# Patient Record
Sex: FEMALE | Race: WHITE | Employment: FULL TIME | URBAN - METROPOLITAN AREA
[De-identification: names, ages, dates, MRNs, and addresses within clinical notes are randomized per-mention and may not be internally consistent; named-entity substitution may affect disease eponyms.]

---

## 2017-01-12 PROBLEM — T78.2XXA ANAPHYLAXIS: Status: ACTIVE | Noted: 2017-01-12

## 2017-05-11 ENCOUNTER — HOSPITAL ENCOUNTER (OUTPATIENT)
Dept: ULTRASOUND IMAGING | Age: 62
Discharge: HOME | End: 2017-05-11
Attending: RADIOLOGY
Payer: COMMERCIAL

## 2017-05-11 ENCOUNTER — HOSPITAL ENCOUNTER (OUTPATIENT)
Dept: MAMMOGRAPHY | Age: 62
Discharge: HOME | End: 2017-05-11
Attending: RADIOLOGY
Payer: COMMERCIAL

## 2017-05-11 DIAGNOSIS — N63.0 BREAST MASS: ICD-10-CM

## 2017-05-11 DIAGNOSIS — Z98.890 STATUS POST RIGHT BREAST BIOPSY: ICD-10-CM

## 2017-05-11 PROCEDURE — 88305 TISSUE EXAM BY PATHOLOGIST: CPT | Performed by: RADIOLOGY

## 2017-05-11 PROCEDURE — 19083 BX BREAST 1ST LESION US IMAG: CPT

## 2017-05-11 PROCEDURE — 74011000250 HC RX REV CODE- 250: Performed by: RADIOLOGY

## 2017-05-11 RX ORDER — LIDOCAINE HYDROCHLORIDE AND EPINEPHRINE 10; 10 MG/ML; UG/ML
10 INJECTION, SOLUTION INFILTRATION; PERINEURAL
Status: COMPLETED | OUTPATIENT
Start: 2017-05-11 | End: 2017-05-11

## 2017-05-11 RX ORDER — LIDOCAINE HYDROCHLORIDE AND EPINEPHRINE 10; 10 MG/ML; UG/ML
INJECTION, SOLUTION INFILTRATION; PERINEURAL
Status: DISPENSED
Start: 2017-05-11 | End: 2017-05-11

## 2017-05-11 RX ORDER — LIDOCAINE HYDROCHLORIDE 10 MG/ML
9 INJECTION INFILTRATION; PERINEURAL
Status: COMPLETED | OUTPATIENT
Start: 2017-05-11 | End: 2017-05-11

## 2017-05-11 RX ORDER — LIDOCAINE HYDROCHLORIDE 10 MG/ML
INJECTION INFILTRATION; PERINEURAL
Status: DISPENSED
Start: 2017-05-11 | End: 2017-05-11

## 2017-05-11 RX ADMIN — LIDOCAINE HYDROCHLORIDE,EPINEPHRINE BITARTRATE 20 ML: 10; .01 INJECTION, SOLUTION INFILTRATION; PERINEURAL at 09:15

## 2017-05-11 RX ADMIN — LIDOCAINE HYDROCHLORIDE 18 ML: 10 INJECTION, SOLUTION INFILTRATION; PERINEURAL at 09:15

## 2017-05-11 NOTE — DISCHARGE INSTRUCTIONS
POST STEREOTACTIC BIOPSY INSTRUCTIONS    1. WOUND CARE:   -Please keep an ice pack on the biopsy site for 20 minutes each hour through the day of the procedure. May     discontinue ice at bedtime. Do not apply ice directly to skin, protect by using thin cloth covering.    -The morning after biopsy, you may remove the gauze pad dressing. 2. Please wear your most supportive bra for 24 hours. Sleep in your bra the night of the biopsy. This will reduce the risk of bleeding, also help to hold ice pack in place. 3. You may shower after 24 hours, otherwise please keep site dry. Steri-strips may get wet. 4. If bleeding occurs from the site:  - Apply direct pressure to the site, holding it tightly for 5 minutes  - If bleeding continues after 5 minutes, call your physician    5. NO HEAVY LIFTING (GREATER THAN 5 lbs), vacuuming and/or sports activities for 48 hours after biopsy    6. Your pathology results are usually complete within 48 hours of the procedure time, excluding weekends. 7. You may take TYLENOL extra strength for pain. NO aspirin or aspirin containing products. 8. Call your doctor for a follow up appointment.

## 2017-06-06 ENCOUNTER — HOSPITAL ENCOUNTER (OUTPATIENT)
Dept: NUCLEAR MEDICINE | Age: 62
Discharge: HOME | End: 2017-06-06
Payer: COMMERCIAL

## 2017-06-06 ENCOUNTER — HOSPITAL ENCOUNTER (OUTPATIENT)
Dept: ULTRASOUND IMAGING | Age: 62
Discharge: HOME | End: 2017-06-06
Payer: COMMERCIAL

## 2017-06-06 ENCOUNTER — HOSPITAL ENCOUNTER (OUTPATIENT)
Dept: NON INVASIVE DIAGNOSTICS | Age: 62
Discharge: HOME | End: 2017-06-06
Payer: COMMERCIAL

## 2017-06-06 DIAGNOSIS — R06.02 SHORTNESS OF BREATH: ICD-10-CM

## 2017-06-06 DIAGNOSIS — N18.9 CKD (CHRONIC KIDNEY DISEASE): ICD-10-CM

## 2017-06-06 PROCEDURE — 78452 HT MUSCLE IMAGE SPECT MULT: CPT

## 2017-06-06 PROCEDURE — 76775 US EXAM ABDO BACK WALL LIM: CPT

## 2017-06-06 PROCEDURE — 93017 CV STRESS TEST TRACING ONLY: CPT

## 2017-06-07 LAB
ATTENDING PHYSICIAN, CST07: NORMAL
DIAGNOSIS, 93000: NORMAL
DUKE TM SCORE RESULT, CST14: NORMAL
DUKE TREADMILL SCORE, CST13: NORMAL
ECG INTERP BEFORE EX, CST11: NORMAL
ECG INTERP DURING EX, CST12: NORMAL
FUNCTIONAL CAPACITY, CST17: NORMAL
KNOWN CARDIAC CONDITION, CST08: NORMAL
MAX. DIASTOLIC BP, CST04: 86 MMHG
MAX. HEART RATE, CST05: 169 BPM
MAX. SYSTOLIC BP, CST03: 168 MMHG
OVERALL BP RESPONSE TO EXERCISE, CST16: NORMAL
OVERALL HR RESPONSE TO EXERCISE, CST15: NORMAL
PEAK EX METS, CST10: 7.7 METS
PROTOCOL NAME, CST01: NORMAL
TEST INDICATION, CST09: NORMAL

## 2018-01-26 ENCOUNTER — HOSPITAL ENCOUNTER (OUTPATIENT)
Dept: MAMMOGRAPHY | Age: 63
Discharge: HOME | End: 2018-01-26
Payer: OTHER GOVERNMENT

## 2018-01-26 DIAGNOSIS — N64.9 DISORDER OF BREAST: ICD-10-CM

## 2018-01-26 PROCEDURE — 77066 DX MAMMO INCL CAD BI: CPT

## 2018-08-27 ENCOUNTER — HOSPITAL ENCOUNTER (OUTPATIENT)
Dept: PHYSICAL THERAPY | Age: 63
End: 2018-08-27

## 2018-08-29 ENCOUNTER — HOSPITAL ENCOUNTER (OUTPATIENT)
Dept: PHYSICAL THERAPY | Age: 63
Discharge: HOME | End: 2018-08-29
Payer: OTHER GOVERNMENT

## 2018-08-29 PROCEDURE — 97112 NEUROMUSCULAR REEDUCATION: CPT | Performed by: PHYSICAL THERAPIST

## 2018-08-29 PROCEDURE — 97162 PT EVAL MOD COMPLEX 30 MIN: CPT | Performed by: PHYSICAL THERAPIST

## 2018-08-29 NOTE — PROGRESS NOTES
PT VESTIBULAR AND TREATMENT    Patient Name: New Blount  Date:2018  : 1955  [x]  Patient  Verified  Payor: Halima Cruz / Plan: Angelina  / Product Type: Federal Funded Programs /    In [employer] time:1217  Total Treatment Time (min): 35  Visit #: 1     Treatment Area: Vertigo [R42]    SUBJECTIVE  Pain Level (0-10 scale): 0/10  Any medication changes, allergies to medications, diagnosis change, or new procedure performed: see summary sheet for update  Subjective functional status/changes:  Pt is a 58year old female with subjective complaints of vertigo that has been going on for about a year. She state that her symptoms increase/room spinning when she looks down, tilts her head to the L, turns on the L, and rolls to the L side of the bed. She states that when she gets the episodes of vertigo last for ~40 sec. She reports she gets about a 15 episodes a day pending on what she is doing. On  she notes that when the pressure increases she was so dizzy she could not walk. She has not had imaging of the head/neck. She states that her symptoms are eased with head shakes and that she can look at a certain direction the dizziness will stop. Negative for red flags.  FOTO: 52    OBJECTIVE    Eval: 10 min    Other tests/comments: See hard chart for vestibular objective info     Patient Education/ Neuromuscular Re-education : [x] Discussed POT including PT expectation, established HEP with pictures and instruction, post Epley maneuver   (minutes) : 25    Pain Level (0-10 scale) post treatment: 0/10    ASSESSMENT  [x]  See Plan of Care    PLAN  [x]  Upgrade activities as tolerated     [x]  Continue plan of care  []  Discharge due to:_  [x] Other:_ POC  1-2x/week for 4 weeks     Justification for Eval Code Complexity:  Patient History : chronicity  Examination see exam   Clinical Presentation: evolving   Clinical Decision Making : FOTO : [phone]      Delmi Jack, DPT 8/29/2018

## 2018-08-29 NOTE — PROGRESS NOTES
[address] [facility] AT [facility]  [address] [address],[address], Clarissa cotter, Alex Black  Phone [phone]  Fax [phone] / [address] [address] PHYSICAL THERAPY SERVICES  Patient Name: Kris Sanchez : 1955   Medical   Diagnosis: Vertigo [R42] Treatment Diagnosis: Vertigo   Onset Date: 1 year ago     Referral Source: Chelita Vizcarra DO Start of Care [facility], [place]): 2018   Prior Hospitalization: See medical history Provider #: [id number]   Prior Level of Function: IND   Comorbidities: Thyroid problems   Medications: Verified on Patient Summary List   The Plan of Care and following information is based on the information from the initial evaluation.   =================================================================================  Assessment / ledesma information:  Kris Sanchez is a 58 y.o. female who presents to PT with chief complaint of vertigo that has been going on for about a year. She state that her symptoms increase/room spinning when she looks down, tilts her head to the L, turns on the L, and rolls to the L side of the bed. She states that when she gets the episodes of vertigo last for ~40 sec. She reports she gets about a 15 episodes a day pending on what she is doing. On  she notes that when the pressure increases she was so dizzy she could not walk. She has not had imaging of the head/neck. She states that her symptoms are eased with head shakes and that she can look at a certain direction the dizziness will stop. Negative for red flags. FOTO: 52/100. Objectively, the patient demonstrates + [employer] to the R indicating + BPPV on the R. Suspected symptoms on the L however, not tested due to R correction. Will assess at later visit. Pt will benefit from PT/Vestibular rehab to address these deficits, improve functional independence and reduce dizziness and imbalance with normal daily activities.  Thank you for this referral. =================================================================================  Eval Complexity: History: MEDIUM  Complexity : 1-2 comorbidities / personal factors will impact the outcome/ POC Exam:HIGH Complexity : 4+ Standardized tests and measures addressing body structure, function, activity limitation and / or participation in recreation  Presentation: MEDIUM Complexity : Evolving with changing characteristics  Clinical Decision Making:MEDIUM Complexity : FOTO score of 26-74Overall Complexity:MEDIUM  Problem List: impaired gait/ balance   Treatment Plan may include any combination of the following: Therapeutic exercise, Therapeutic activities, Neuromuscular re-education, Physical agent/modality, Gait/balance training, Manual therapy and Patient education  Patient / Family readiness to learn indicated by: asking questions and trying to perform skills  Persons(s) to be included in education: patient (P)  Barriers to Learning/Limitations: None  Measures taken:    Patient Goal (s): Get rid of vertigo   Patient self reported health status: excellent  Rehabilitation Potential: good   Short Term Goals: To be accomplished in  4  weeks:  1. Pt will be IND and compliant with HEP for self-management of symptoms. 2. Pt will demo - [employer] B as an indicator of improved BPPV symptoms.  Long Term Goals: To be accomplished in  8  weeks:  1. Pt will report 50% improvement of symptoms as an indicator of improved mobility. 2. Pt will improve FOTO to at least 63/100 as a functional indicator of improved mobility. 3. Pt will be able to roll over in bed without restrictions/vertigo to improved sleeping stability.     Frequency / Duration:   Patient to be seen  1-2  times per week for 4  weeks:  Patient / Caregiver education and instruction: self care and activity modification  G-Codes (GP): JANNA  Therapist Signature: Camie Jeans, DPT Date: 0/25/0784   Certification Period: JANNA Time: 12:45 PM ===========================================================================================  I certify that the above Physical Therapy Services are being furnished while the patient is under my care. I agree with the treatment plan and certify that this therapy is necessary. Physician Signature:        Date:       Time:     Please sign and return to [facility] [place] or you may fax the signed copy to [phone]. Thank you.

## 2018-08-31 ENCOUNTER — HOSPITAL ENCOUNTER (OUTPATIENT)
Dept: PHYSICAL THERAPY | Age: 63
Discharge: HOME | End: 2018-08-31
Payer: OTHER GOVERNMENT

## 2018-08-31 PROCEDURE — 97112 NEUROMUSCULAR REEDUCATION: CPT | Performed by: PHYSICAL THERAPIST

## 2018-08-31 NOTE — PROGRESS NOTES
PT DAILY TREATMENT NOTE  Patient Name: Moises Olmedo Date:2018 : 1955 [x]  Patient  Verified Payor: Fede Mejia / Plan: Greg Araujo / Product Type: Federal Funded Programs / In time:1134  Out time:1202 Total Treatment Time (min): 28 Visit #: 2 of  Treatment Area: Vertigo [R42] SUBJECTIVE Pain Level (0-10 scale): 0 Any medication changes, allergies to medications, adverse drug reactions, diagnosis change, or new procedure performed?: [x] No    [] Yes (see summary sheet for update) Subjective functional status/changes:   [] No changes reported Pt reports she feels good with decreased dizziness with head turns to the L only to the R now. OBJECTIVE 28 min Manual Therapy: Technique:     
Epley x2 to the L Rationale:      decrease pain, increase ROM, increase tissue extensibility and decrease trigger points to improve patient's ability to improve head turns with reduced dizziness With 
 [] TE 
 [] TA 
 [] neuro 
 [] other: Patient Education: [x] Review HEP [] Progressed/Changed HEP based on:  
[] positioning   [] body mechanics   [] transfers   [] heat/ice application [] Graston Education: Explained the effects and benefits of Graston Technique therapy including potential for post treatment soreness and bruising. [] Other:   
 
Other Objective/Functional Measures:  
+ samra garcia pike to the L Pain Level (0-10 scale) post treatment: 0 
 
ASSESSMENT/Changes in Function: Pt presents with B + BPPV which responded + to epley maneuver. Assess effects next session Patient will continue to benefit from skilled PT services to address imbalance/dizziness to attain remaining goals. Progress towards goals / Updated goals: 
1st FU visit, initiated PT POC PLAN [x]  Upgrade activities as tolerated     [x]  Continue plan of care 
[]  Update interventions per flow sheet      
[]  Discharge due to:_ 
 [x]  Other: check goals, assess jose nettles to the L next visit Tomer Bishop DPT 8/31/2018  3:25 PM 
 
Future Appointments Date Time Provider Tony Child 9/5/2018 11:00 AM Tomer Bishop DPT [facility]  
9/7/2018 11:30 AM Tomer Bishop DPT [facility]  
9/11/2018 11:00 AM Tomer Bishop DPT [facility]  
9/14/2018 1:30 PM Tomer Bishop DPT [facility]  
9/18/2018 11:00 AM Tomer Bishop DPT [facility]  
9/21/2018 1:30 PM Tomer Bishop DPT [facility]  
9/25/2018 11:00 AM Tomer Bishop DPT [facility]  
9/28/2018 1:30 PM Tomer Bishop DPT [facility]

## 2018-09-05 ENCOUNTER — HOSPITAL ENCOUNTER (OUTPATIENT)
Dept: PHYSICAL THERAPY | Age: 63
Discharge: HOME | End: 2018-09-05
Payer: OTHER GOVERNMENT

## 2018-09-05 PROCEDURE — 97140 MANUAL THERAPY 1/> REGIONS: CPT | Performed by: PHYSICAL THERAPIST

## 2018-09-05 NOTE — PROGRESS NOTES
PT DAILY TREATMENT NOTE     Patient Name: Kris Sanchez  Date:2018  : 1955  [x]  Patient  Verified  Payor: Tomer Sahni / Plan: Jah Liu / Product Type: Federal Funded Programs /    In [employer] time:1130  Total Treatment Time (min): 28  Visit #: 3 of   Treatment Area: Vertigo [R42]    SUBJECTIVE  Pain Level (0-10 scale): 0  Any medication changes, allergies to medications, adverse drug reactions, diagnosis change, or new procedure performed?: [x] No    [] Yes (see summary sheet for update)  Subjective functional status/changes:   [] No changes reported  I only get dizzy with up and down head movements    OBJECTIVE    28 min Manual Therapy: Technique:      Epley x2 to the L   Rationale:      decrease pain, increase ROM, increase tissue extensibility and decrease trigger points to improve patient's ability to improve head turns with reduced dizziness        With   [] TE   [] TA   [] neuro   [] other: Patient Education: [x] Review HEP    [] Progressed/Changed HEP based on:   [] positioning   [] body mechanics   [] transfers   [] heat/ice application    [] Graston Education: Explained the effects and benefits of Graston Technique therapy including potential for post treatment soreness and bruising. [] Other:      Other Objective/Functional Measures:   + samra garcia pike to the R with delayed response and head turns to the L     Pain Level (0-10 scale) post treatment: 0    ASSESSMENT/Changes in Function: Pt presents with B + BPPV which responded + to epley maneuver. Assess effects next session    Patient will continue to benefit from skilled PT services to address imbalance/dizziness to attain remaining goals. Progress towards goals / Updated goals: · Short Term Goals: To be accomplished in  4  weeks:  1. Pt will be IND and compliant with HEP for self-management of symptoms. 2. Pt will demo - [employer] B as an indicator of improved BPPV symptoms.  Progressing, 9/5/18  · Long Term Goals: To be accomplished in  8  weeks:  1. Pt will report 50% improvement of symptoms as an indicator of improved mobility. 2. Pt will improve FOTO to at least 63/100 as a functional indicator of improved mobility. 3. Pt will be able to roll over in bed without restrictions/vertigo to improved sleeping stability.     PLAN  [x]  Upgrade activities as tolerated     [x]  Continue plan of care  []  Update interventions per flow sheet       []  Discharge due to:_  [x]  Other: check goals, assess jose nettles to the L next visit      Efren Park DPT 9/5/2018  3:25 PM    Future Appointments  Date Time Provider Tony Child   9/7/2018 11:30 AM Efren Park, [facility]   9/11/2018 11:00 AM Efren Park, [facility]   9/14/2018 1:30 PM Efren Park, [facility]   9/18/2018 11:00 AM Efren Park, [facility]   9/21/2018 1:30 PM Efren Park, [facility]   9/25/2018 11:00 AM Efren Park, [facility]   9/28/2018 1:30 PM Efren Park, [facility]

## 2018-09-07 ENCOUNTER — HOSPITAL ENCOUNTER (OUTPATIENT)
Dept: PHYSICAL THERAPY | Age: 63
Discharge: HOME | End: 2018-09-07
Payer: OTHER GOVERNMENT

## 2018-09-07 PROCEDURE — 97140 MANUAL THERAPY 1/> REGIONS: CPT | Performed by: PHYSICAL THERAPIST

## 2018-09-07 NOTE — PROGRESS NOTES
PT DAILY TREATMENT NOTE     Patient Name: Leonardo Martino  Date:2018  : 1955  [x]  Patient  Verified  Payor: Lashae Elena / Plan: Jasmine Brittle / Product Type: Federal Funded Programs /    In [employer]  Out time:1200  Total Treatment Time (min): 27  Visit #: 4 of   Treatment Area: Vertigo [R42]    SUBJECTIVE  Pain Level (0-10 scale): 0  Any medication changes, allergies to medications, adverse drug reactions, diagnosis change, or new procedure performed?: [x] No    [] Yes (see summary sheet for update)  Subjective functional status/changes:   [] No changes reported  Pt reports continued dizziness with up and down head movements. OBJECTIVE    27 min Manual Therapy: Technique:      Epley x2 to the L   Rationale:      decrease pain, increase ROM, increase tissue extensibility and decrease trigger points to improve patient's ability to improve head turns with reduced dizziness        With   [] TE   [] TA   [] neuro   [] other: Patient Education: [x] Review HEP    [] Progressed/Changed HEP based on:   [] positioning   [] body mechanics   [] transfers   [] heat/ice application    [] Graston Education: Explained the effects and benefits of Graston Technique therapy including potential for post treatment soreness and bruising. [] Other:      Other Objective/Functional Measures:   + samra garcia pike to the R with delayed response and head turns to the L     Pain Level (0-10 scale) post treatment: 0    ASSESSMENT/Changes in Function: Pt presents with B + BPPV which responded + to epley maneuver. Assess effects next session. Patient will continue to benefit from skilled PT services to address imbalance/dizziness to attain remaining goals. Progress towards goals / Updated goals: · Short Term Goals: To be accomplished in  4  weeks:  1. Pt will be IND and compliant with HEP for self-management of symptoms. On going, compliant with post epley instructions 18  2.  Pt will demo - [employer] B as an indicator of improved BPPV symptoms. Progressing, 9/5/18  · Long Term Goals: To be accomplished in  8  weeks:  1. Pt will report 50% improvement of symptoms as an indicator of improved mobility. 2. Pt will improve FOTO to at least 63/100 as a functional indicator of improved mobility. 3. Pt will be able to roll over in bed without restrictions/vertigo to improved sleeping stability.     PLAN  [x]  Upgrade activities as tolerated     [x]  Continue plan of care  []  Update interventions per flow sheet       []  Discharge due to:_  [x]  Other: check goals, assess garcia yoon to the L next visit      Evi Hobbs DPT 9/7/2018  3:25 PM    Future Appointments  Date Time Provider Tony Child   9/11/2018 11:00 AM Evi Hobbs DPT [facility]   9/14/2018 1:30 PM Evi Hobbs DPT [facility]   9/18/2018 11:00 AM Evi Hobbs DPT [facility]   9/21/2018 1:30 PM Evi Hobbs DPT [facility]   9/25/2018 11:00 AM Evi Hobbs DPT [facility]   9/28/2018 1:30 PM Evi Hobbs DPT [facility]

## 2018-09-11 ENCOUNTER — APPOINTMENT (OUTPATIENT)
Dept: PHYSICAL THERAPY | Age: 63
End: 2018-09-11
Payer: OTHER GOVERNMENT

## 2018-09-14 ENCOUNTER — APPOINTMENT (OUTPATIENT)
Dept: PHYSICAL THERAPY | Age: 63
End: 2018-09-14
Payer: OTHER GOVERNMENT

## 2018-09-18 ENCOUNTER — APPOINTMENT (OUTPATIENT)
Dept: PHYSICAL THERAPY | Age: 63
End: 2018-09-18
Payer: OTHER GOVERNMENT

## 2018-09-21 ENCOUNTER — HOSPITAL ENCOUNTER (OUTPATIENT)
Dept: PHYSICAL THERAPY | Age: 63
Discharge: HOME | End: 2018-09-21
Payer: OTHER GOVERNMENT

## 2018-09-21 PROCEDURE — 97140 MANUAL THERAPY 1/> REGIONS: CPT | Performed by: PHYSICAL THERAPIST

## 2018-09-21 NOTE — PROGRESS NOTES
PT DAILY TREATMENT NOTE     Patient Name: Gary Valdez  Date:2018  : 1955  [x]  Patient  Verified  Payor: Marcial Marie / Plan: Eugenia Trejo / Product Type: Federal Funded Programs /    In time:130  Out time:155  Total Treatment Time (min): 25  Visit #: 5 of   Treatment Area: Vertigo [R42]    SUBJECTIVE  Pain Level (0-10 scale): 0  Any medication changes, allergies to medications, adverse drug reactions, diagnosis change, or new procedure performed?: [x] No    [] Yes (see summary sheet for update)  Subjective functional status/changes:   [] No changes reported  I have only had it a few times when I look down to the R and roll over in bed to the R. Pt reports 40% improvement since [facility]. She reports decreased frequency of vertigo. OBJECTIVE    25 min Manual Therapy: Technique:      Epley x1 to the R   Rationale:      decrease pain, increase ROM, increase tissue extensibility and decrease trigger points to improve patient's ability to improve head turns with reduced dizziness        With   [] TE   [] TA   [] neuro   [] other: Patient Education: [x] Review HEP    [] Progressed/Changed HEP based on:   [] positioning   [] body mechanics   [] transfers   [] heat/ice application    [] Graston Education: Explained the effects and benefits of Graston Technique therapy including potential for post treatment soreness and bruising. [] Other:      Other Objective/Functional Measures:   + samra garcia pike to the R      Pain Level (0-10 scale) post treatment: 0    ASSESSMENT/Changes in Function: Pt presents with R + BPPV which responded + to epley maneuver. Assess effects next session. Patient will continue to benefit from skilled PT services to address imbalance/dizziness to attain remaining goals. Progress towards goals / Updated goals: · Short Term Goals: To be accomplished in  4  weeks:  1. Pt will be IND and compliant with HEP for self-management of symptoms.  On going, compliant with post epley instructions 9/7/18  2. Pt will demo - [employer] B as an indicator of improved BPPV symptoms. Progressing, 9/5/18  · Long Term Goals: To be accomplished in  8  weeks:  1. Pt will report 50% improvement of symptoms as an indicator of improved mobility. Progressing, 40% improvement 9/21/18  2. Pt will improve FOTO to at least 63/100 as a functional indicator of improved mobility. 3. Pt will be able to roll over in bed without restrictions/vertigo to improved sleeping stability.  Progressing 9/21/18    PLAN  [x]  Upgrade activities as tolerated     [x]  Continue plan of care  []  Update interventions per flow sheet       []  Discharge due to:_  [x]  Other: check goals, assess jose nettles to the L next visit      Stevan Marx DPT 9/21/2018  155 PM    Future Appointments  Date Time Provider Tony Child   9/25/2018 11:00 AM Stevan Marx DPT [facility]   9/28/2018 1:30 PM Stevan Marx DPT [facility]

## 2018-09-25 ENCOUNTER — APPOINTMENT (OUTPATIENT)
Dept: PHYSICAL THERAPY | Age: 63
End: 2018-09-25
Payer: OTHER GOVERNMENT

## 2018-10-02 ENCOUNTER — HOSPITAL ENCOUNTER (OUTPATIENT)
Dept: PHYSICAL THERAPY | Age: 63
Discharge: HOME | End: 2018-10-02
Payer: OTHER GOVERNMENT

## 2018-10-02 PROCEDURE — 97140 MANUAL THERAPY 1/> REGIONS: CPT | Performed by: PHYSICAL THERAPIST

## 2018-10-02 NOTE — PROGRESS NOTES
PT DAILY TREATMENT NOTE     Patient Name: Gaurang Salcedo  Date:10/2/2018  : 1955  [x]  Patient  Verified  Payor: Irving Kaur / Plan: Kevin Gene / Product Type: Federal Funded Programs /    In [employer] time:820  Total Treatment Time (min): 32  Visit #: 5 of   Treatment Area: Vertigo [R42]    SUBJECTIVE  Pain Level (0-10 scale): 0  Any medication changes, allergies to medications, adverse drug reactions, diagnosis change, or new procedure performed?: [x] No    [] Yes (see summary sheet for update)  Subjective functional status/changes:   [] No changes reported  Pt reports 50% improvement since SOC. She notes improvements in overall frequency of symptoms and decreased symptoms. She no longer has symptoms when she looks down while walking. She reports she continues to have vertigo when she turns over to each side at night. OBJECTIVE    32 min Manual Therapy: Technique:      Epley x2 to the L, PT reassessment   Rationale:      decrease pain, increase ROM, increase tissue extensibility and decrease trigger points to improve patient's ability to improve head turns with reduced dizziness        With   [] TE   [] TA   [] neuro   [] other: Patient Education: [x] Review HEP    [] Progressed/Changed HEP based on:   [] positioning   [] body mechanics   [] transfers   [] heat/ice application    [] Graston Education: Explained the effects and benefits of Graston Technique therapy including potential for post treatment soreness and bruising. [] Other:      Other Objective/Functional Measures:   + samra garcia pike to the L  FOTO improved to 75/100 from 52/100    Pain Level (0-10 scale) post treatment: 0    ASSESSMENT/Changes in Function: Upon reassessment, Pt continues to present with + Garcia pike on the L and was + on the R last session indicating BPPV B. Noted improvements in FOTO score indicate improvements and justify improvements in ADL tolerance.  PT would benefit from continued therapy to further address these deficits to return to OF. Patient will continue to benefit from skilled PT services to address imbalance/dizziness to attain remaining goals. Progress towards goals / Updated goals: · Short Term Goals: To be accomplished in  4  weeks:  1. Pt will be IND and compliant with HEP for self-management of symptoms. On going, compliant with post epley instructions 10/2/18  2. Pt will demo - [employer] B as an indicator of improved BPPV symptoms. Progressing, 10/2/18  · Long Term Goals: To be accomplished in  8  weeks:  1. Pt will report 50% improvement of symptoms as an indicator of improved mobility met 10/2/18  2. Pt will improve FOTO to at least 63/100 as a functional indicator of improved mobility. met 10/2/18  3. Pt will be able to roll over in bed without restrictions/vertigo to improved sleeping stability.  Progressing, 10/2/18    PLAN  [x]  Upgrade activities as tolerated     [x]  Continue plan of care  []  Update interventions per flow sheet       []  Discharge due to:_  [x]  Other: Continue PT 2x/week for 4 weeks to progress towards long-term goals    Ene Bradford DPT 10/2/2018  838 AM    Future Appointments  Date Time Provider Tony Child   10/5/2018 11:00 AM Ene Bradford DPT [facility]   10/8/2018 1:00 PM Ene Bradford DPT [facility]   10/12/2018 11:00 AM Ene Bradford DPT [facility]   10/16/2018 12:30 PM Ene Bradford DPT [facility]   10/19/2018 11:00 AM Ene Bradford DPT [facility]   10/23/2018 11:00 AM Ene Bradford DPT [facility]   10/26/2018 11:00 AM Ene Bradford DPT [facility]   10/30/2018 11:00 AM Ene Bradford DPT [facility]

## 2018-10-02 NOTE — PROGRESS NOTES
[address] [facility]AP[facility] AT [facility]  [address] [facility] [address] [address],[address], [place], [place]  Phone [phone]  Fax [phone]  PROGRESS NOTE  Patient Name: Sesar Burk : 1955   Treatment/Medical Diagnosis: Vertigo [R42]   Referral Source: Almita Baca DO     Date of Initial Visit: 18 Attended Visits: 5 Missed Visits: 1     SUMMARY OF TREATMENT  Pt seen for vestibular rehab for 5 sessions for B BPPV. Therapy has included the Epley maneuver to reduce symptoms. CURRENT STATUS  Pt reports 50% improvement since [facility]. She notes improvements in overall frequency of symptoms and decreased symptoms. She no longer has symptoms when she looks down while walking. She reports she continues to have vertigo when she turns over to each side at night. Upon reassessment, Pt continues to present with + Riley pike on the L and was + on the R last session indicating BPPV B. Noted improvements in FOTO score indicate improvements and justify improvements in ADL tolerance. PT would benefit from continued therapy to further address these deficits to return to PLOF.        Goal/Measure of Progress Goal Met? · Short Term Goals: To be accomplished in  4  weeks:  1. Pt will be IND and compliant with HEP for self-management of symptoms. On going, compliant with post epley instructions 10/2/18  2. Pt will demo - [employer] B as an indicator of improved BPPV symptoms. Progressing, 10/2/18  · Long Term Goals: To be accomplished in  8  weeks:  1. Pt will report 50% improvement of symptoms as an indicator of improved mobility met 10/2/18  2. Pt will improve FOTO to at least 63/100 as a functional indicator of improved mobility. met 10/2/18  3. Pt will be able to roll over in bed without restrictions/vertigo to improved sleeping stability. Progressing, 10/2/18    New Goals to be achieved in __4__  weeks:  1. Pt will demo - [employer] B as an indicator of improved BPPV symptoms.   2. Pt will be able to roll over in bed without restrictions/vertigo to improved sleeping stability. G-Codes: NA  RECOMMENDATIONS  Continue PT 2x/week for 4 weeks to progress towards long-term goals  If you have any questions/comments please contact us directly at [phone]. Thank you for allowing us to assist in the care of your patient. Therapist Signature: Yogi Leon DPT Date: 10/2/2018     Time: 8:41 AM   NOTE TO PHYSICIAN:  PLEASE COMPLETE THE ORDERS BELOW AND FAX TO   [facility] at [facility] [place]: [phone]. If you are unable to process this request in 24 hours please contact our office: [phone].    ___ I have read the above report and request that my patient continue as recommended.   ___ I have read the above report and request that my patient continue therapy with the following changes/special instructions:_________________________________________________________   ___ I have read the above report and request that my patient be discharged from therapy.      Physician Signature:        Date:       Time:

## 2018-10-05 ENCOUNTER — HOSPITAL ENCOUNTER (OUTPATIENT)
Dept: PHYSICAL THERAPY | Age: 63
Discharge: HOME | End: 2018-10-05
Payer: OTHER GOVERNMENT

## 2018-10-05 PROCEDURE — 97140 MANUAL THERAPY 1/> REGIONS: CPT | Performed by: PHYSICAL THERAPIST

## 2018-10-05 NOTE — PROGRESS NOTES
PT DAILY TREATMENT NOTE     Patient Name: Bhavya Sarkar  Date:10/5/2018  : 1955  [x]  Patient  Verified  Payor: Laureen Pak / Plan: Fausto Marcus / Product Type: Federal Funded Programs /    In time:1100  Out time:1123  Total Treatment Time (min): 23  Visit #: 6 of -  Treatment Area: Vertigo [R42]    SUBJECTIVE  Pain Level (0-10 scale): 0  Any medication changes, allergies to medications, adverse drug reactions, diagnosis change, or new procedure performed?: [x] No    [] Yes (see summary sheet for update)  Subjective functional status/changes:   [] No changes reported  Pt reports that she still gets dizzy turning over the L in bed but it is much better. OBJECTIVE    23 min Manual Therapy: Technique:      Epley x1 to the L   Rationale:      decrease pain, increase ROM, increase tissue extensibility and decrease trigger points to improve patient's ability to improve head turns with reduced dizziness        With   [] TE   [] TA   [] neuro   [] other: Patient Education: [x] Review HEP    [] Progressed/Changed HEP based on:   [] positioning   [] body mechanics   [] transfers   [] heat/ice application    [] Graston Education: Explained the effects and benefits of Graston Technique therapy including potential for post treatment soreness and bruising. [] Other:      Other Objective/Functional Measures:   + samra garcia pike to the L      Pain Level (0-10 scale) post treatment: 0    ASSESSMENT/Changes in Function: + garcia pike to the L corrected x1 with Epley maneuver. Noted improvements in FOTO score indicate improvements and justify improvements in ADL tolerance. PT would benefit from continued therapy to further address these deficits to return to PLOF. Patient will continue to benefit from skilled PT services to address imbalance/dizziness to attain remaining goals. Progress towards goals / Updated goals:  1.  Pt will demo - [employer] B as an indicator of improved BPPV symptoms. 2. Pt will be able to roll over in bed without restrictions/vertigo to improved sleeping stability.     PLAN  [x]  Upgrade activities as tolerated     [x]  Continue plan of care  []  Update interventions per flow sheet       []  Discharge due to:_  [x]  Other: check goals    Bell Saab DPT 10/5/2018  1230 PM    Future Appointments  Date Time Provider Tony Child   10/8/2018 1:00 PM Bell Saab DPT [facility]   10/12/2018 11:00 AM Bell Saab DPT [facility]   10/16/2018 12:30 PM Bell Saab DPT [facility]   10/19/2018 11:00 AM Bell Saab DPT [facility]   10/23/2018 11:00 AM Bell Saab DPT [facility]   10/26/2018 11:00 AM Bell Saab DPT [facility]   10/30/2018 11:00 AM Bell Saab DPT [facility]

## 2018-10-08 ENCOUNTER — HOSPITAL ENCOUNTER (OUTPATIENT)
Dept: PHYSICAL THERAPY | Age: 63
Discharge: HOME | End: 2018-10-08
Payer: OTHER GOVERNMENT

## 2018-10-08 PROCEDURE — 97112 NEUROMUSCULAR REEDUCATION: CPT | Performed by: PHYSICAL THERAPIST

## 2018-10-08 NOTE — PROGRESS NOTES
PT DAILY TREATMENT NOTE     Patient Name: Julita Fournier  Date:10/8/2018  : 1955  [x]  Patient  Verified  Payor: Mike Redmond / Plan: Jensen Fu / Product Type: Federal Funded Programs /    In time:103 Out time:1125  Total Treatment Time (min): 22  Visit #: 2 of   Treatment Area: Vertigo [R42]    SUBJECTIVE  Pain Level (0-10 scale): 0  Any medication changes, allergies to medications, adverse drug reactions, diagnosis change, or new procedure performed?: [x] No    [] Yes (see summary sheet for update)  Subjective functional status/changes:   [] No changes reported  I have not been dizzy all weekend. I was able to look up at the birds while hiking and I didn't get dizzy. I was also able to lay on my side and watch TV and I didn't get dizzy. OBJECTIVE\    17 min Neuromuscular Re-education:  []  See flow sheet : balance assessment   Rationale: improve balance and increase proprioception  to improve the patients ability to improve ADL tolerance without LOB    5 min Manual Therapy: Technique:      Nicky GARCIA    Rationale:      decrease pain, increase ROM, increase tissue extensibility and decrease trigger points to improve patient's ability to improve head turns with reduced dizziness        With   [] TE   [] TA   [] neuro   [] other: Patient Education: [x] Review HEP    [] Progressed/Changed HEP based on:   [] positioning   [] body mechanics   [] transfers   [] heat/ice application    [] Graston Education: Explained the effects and benefits of Graston Technique therapy including potential for post treatment soreness and bruising. [] Other:      Other Objective/Functional Measures:   - samra jose lindseye B  Pt was able to perform static acts per flow sheet    Pain Level (0-10 scale) post treatment: 0    ASSESSMENT/Changes in Function: Negative garcia B indicating negative BPPV B.  Pt able to maintain static balance acts EO/EC without LOB on floor for 30 sec indicating maintained vestibular function. WIll assess next session to see if symptoms remained resolved and progress to HEP following pending symptoms. PT would benefit from continued therapy to further address these deficits to return to [facility]. Patient will continue to benefit from skilled PT services to address imbalance/dizziness to attain remaining goals. Progress towards goals / Updated goals:  1. Pt will demo - [employer] B as an indicator of improved BPPV symptoms. Met 10/8/18  2. Pt will be able to roll over in bed without restrictions/vertigo to improved sleeping stability.     PLAN  [x]  Upgrade activities as tolerated     [x]  Continue plan of care  []  Update interventions per flow sheet       []  Discharge due to:_  []  Other:     Karyle Lei, DPT 10/8/2018  501 PM    Future Appointments  Date Time Provider Tony Child   10/12/2018 11:00 AM Karyle Lei, DPT [facility]   10/16/2018 12:30 PM Karyle Lei, DPT [facility]   10/19/2018 11:00 AM Karyle Lei, DPT [facility]   10/23/2018 11:00 AM Karyle Lei, DPT [facility]   10/26/2018 11:00 AM Karyle Lei, DPT [facility]   10/30/2018 11:00 AM Karyle Lei, DPT [facility]

## 2018-10-12 ENCOUNTER — APPOINTMENT (OUTPATIENT)
Dept: PHYSICAL THERAPY | Age: 63
End: 2018-10-12
Payer: OTHER GOVERNMENT

## 2018-10-16 ENCOUNTER — HOSPITAL ENCOUNTER (OUTPATIENT)
Dept: PHYSICAL THERAPY | Age: 63
Discharge: HOME | End: 2018-10-16
Payer: OTHER GOVERNMENT

## 2018-10-16 PROCEDURE — 97140 MANUAL THERAPY 1/> REGIONS: CPT | Performed by: PHYSICAL THERAPIST

## 2018-10-16 NOTE — PROGRESS NOTES
PT DAILY TREATMENT NOTE     Patient Name: Alistair Gonzalez  Date:10/16/2018  : 1955  [x]  Patient  Verified  Payor: Kallie Patterson / Plan: Virgil Bethea / Product Type: Federal Funded Programs /    In [employer] time:1255  Total Treatment Time (min): 25  Visit #: 3  of 8  Treatment Area: Vertigo [R42]    SUBJECTIVE  Pain Level (0-10 scale): 0  Any medication changes, allergies to medications, adverse drug reactions, diagnosis change, or new procedure performed?: [x] No    [] Yes (see summary sheet for update)  Subjective functional status/changes:   [] No changes reported  Pt reports that she is not dizzy right now but she was dizzy to the L this past weekend. OBJECTIVE\    25 min Manual Therapy: Technique:      Pearlene Ruck pike with epley 1 x to the L   Rationale:      decrease pain, increase ROM, increase tissue extensibility and decrease trigger points to improve patient's ability to improve head turns with reduced dizziness        With   [] TE   [] TA   [] neuro   [] other: Patient Education: [x] Review HEP    [] Progressed/Changed HEP based on:   [] positioning   [] body mechanics   [] transfers   [] heat/ice application    [] Graston Education: Explained the effects and benefits of Graston Technique therapy including potential for post treatment soreness and bruising. [] Other:      Other Objective/Functional Measures:   + samra garcia pike to the L with + correction with epley    Pain Level (0-10 scale) post treatment: 0    ASSESSMENT/Changes in Function: Negative garcia B indicating negative BPPV B. Pt able to maintain static balance acts EO/EC without LOB on floor for 30 sec indicating maintained vestibular function. WIll assess next session to see if symptoms remained resolved and progress to HEP following pending symptoms. PT would benefit from continued therapy to further address these deficits to return to PLOF.      Patient will continue to benefit from skilled PT services to address imbalance/dizziness to attain remaining goals. Progress towards goals / Updated goals:  1. Pt will demo - [employer] B as an indicator of improved BPPV symptoms. Progressing, + to the L this session 10/16/18  2. Pt will be able to roll over in bed without restrictions/vertigo to improved sleeping stability.  Progressing the L 10/16/18    PLAN  [x]  Upgrade activities as tolerated     [x]  Continue plan of care  []  Update interventions per flow sheet       []  Discharge due to:_  []  Other:     Ritika Jimenez DPT 10/16/2018  1252 PM    Future Appointments  Date Time Provider Tony Child   10/19/2018 11:00 AM Ritika Jimenez DPT [facility]   10/23/2018 11:00 AM Ritika Jimenez DPT [facility]   10/26/2018 11:00 AM Ritika Jimenez DPT [facility]   10/30/2018 11:00 AM Ritika Jimenez DPT [facility]

## 2018-10-19 ENCOUNTER — APPOINTMENT (OUTPATIENT)
Dept: PHYSICAL THERAPY | Age: 63
End: 2018-10-19
Payer: OTHER GOVERNMENT

## 2018-10-26 ENCOUNTER — HOSPITAL ENCOUNTER (OUTPATIENT)
Dept: PHYSICAL THERAPY | Age: 63
Discharge: HOME | End: 2018-10-26
Payer: OTHER GOVERNMENT

## 2018-10-26 PROCEDURE — 97140 MANUAL THERAPY 1/> REGIONS: CPT | Performed by: PHYSICAL THERAPIST

## 2018-10-26 NOTE — PROGRESS NOTES
PT DAILY TREATMENT NOTE     Patient Name: Corby Rodriguez  Date:10/26/2018  : 1955  [x]  Patient  Verified  Payor: Perez Favor / Plan: Rashard Chance / Product Type: Federal Funded Programs /    In time:1100 Out time:1126  Total Treatment Time (min): 26  Visit #: 4  of 8  Treatment Area: Vertigo [R42]    SUBJECTIVE  Pain Level (0-10 scale): 0  Any medication changes, allergies to medications, adverse drug reactions, diagnosis change, or new procedure performed?: [x] No    [] Yes (see summary sheet for update)  Subjective functional status/changes:   [] No changes reported  I got dizzy this morning bending over and to the L  OBJECTIVE\    26 min Manual Therapy: Technique:      Lynda nettles with epley 1 x to the L   Rationale:      decrease pain, increase ROM, increase tissue extensibility and decrease trigger points to improve patient's ability to improve head turns with reduced dizziness        With   [] TE   [] TA   [] neuro   [] other: Patient Education: [x] Review HEP    [] Progressed/Changed HEP based on:   [] positioning   [] body mechanics   [] transfers   [] heat/ice application    [] Graston Education: Explained the effects and benefits of Graston Technique therapy including potential for post treatment soreness and bruising. [] Other:      Other Objective/Functional Measures:   + samra garcia pike to the L with + correction with epley    Pain Level (0-10 scale) post treatment: 0    ASSESSMENT/Changes in Function: Pt continues to be + to the L samra garcia pike indicating + BPPV on the L     Patient will continue to benefit from skilled PT services to address imbalance/dizziness to attain remaining goals. Progress towards goals / Updated goals:  1. Pt will demo - [employer] B as an indicator of improved BPPV symptoms. Progressing, + to the L this session 10/26/18  2. Pt will be able to roll over in bed without restrictions/vertigo to improved sleeping stability.  Progressing the L 10/16/18    PLAN  [x]  Upgrade activities as tolerated     [x]  Continue plan of care  []  Update interventions per flow sheet       []  Discharge due to:_  []  Other:     Sanford Fonseca DPT 10/26/2018  115 PM    Future Appointments   Date Time Provider Tony Child   10/30/2018 11:00 AM Ophelia Bullock DPT [facility] [address]

## 2018-11-12 ENCOUNTER — HOSPITAL ENCOUNTER (OUTPATIENT)
Dept: PHYSICAL THERAPY | Age: 63
Discharge: HOME | End: 2018-11-12
Payer: OTHER GOVERNMENT

## 2018-11-12 PROCEDURE — 97112 NEUROMUSCULAR REEDUCATION: CPT | Performed by: PHYSICAL THERAPIST

## 2018-11-12 NOTE — PROGRESS NOTES
PT DAILY TREATMENT NOTE     Patient Name: Jacobo Uriarte  Date:2018  : 1955  [x]  Patient  Verified  Payor: Naveed Gil / Plan: Leatha Ache / Product Type: Federal Funded Programs /    In time:103  Out time:120  Total Treatment Time (min): 17  Visit #: 4of 8  Treatment Area: Vertigo [R42]    SUBJECTIVE  Pain Level (0-10 scale): 0  Any medication changes, allergies to medications, adverse drug reactions, diagnosis change, or new procedure performed?: [x] No    [] Yes (see summary sheet for update)  Subjective functional status/changes:   [] No changes reported  Pt reports 90% improvement since SOC. She reports improvements in the frequency of dizziness. She is able to look up/down without restrictions. She will still get the dizziness when she rolls out of bed and getting out of bed on the L.       OBJECTIVE    17 min Manual Therapy: Technique:      Reassessment   Rationale:      decrease pain, increase ROM, increase tissue extensibility and decrease trigger points to improve patient's ability to improve head turns with reduced dizziness        With   [] TE   [] TA   [] neuro   [] other: Patient Education: [x] Review HEP    [] Progressed/Changed HEP based on:   [] positioning   [] body mechanics   [] transfers   [] heat/ice application    [] Graston Education: Explained the effects and benefits of Graston Technique therapy including potential for post treatment soreness and bruising. [] Other:      Other Objective/Functional Measures:   Negative jose BORDENTO improved to 97/100 from 75/100 at last assessment    Pain Level (0-10 scale) post treatment: 0/10    ASSESSMENT/Changes in Function: Upon reassessment, pt was negative for [employer] indicating negative for BPPV. At this time pt denies functional limitations and improved overall functional mobility. Therefore, pt is ready for DC at this time. Progress towards goals / Updated goals:  1.  Pt will demo - [employer] B as an indicator of improved BPPV symptoms. Met 11/12/18  2. Pt will be able to roll over in bed without restrictions/vertigo to improved sleeping stability. MET 11/12/18    PLAN  []  Upgrade activities as tolerated     []  Continue plan of care  []  Update interventions per flow sheet       [x]  Discharge due to: all goals met, DC to home program  []  Other:     Yogi Leon DPT 11/12/2018  619 PM    No future appointments.

## 2018-11-12 NOTE — PROGRESS NOTES
[address] [facility]ICAL THERAPY AT [facility]  [address] [facility] [address] [address],[address], [place], [place]  Phone [phone]  Fax  SUMMARY  Patient Name: Angeles Santos : 1955   Treatment/Medical Diagnosis: Vertigo [R42]   Referral Source: Gaye Kebede DO     Date of Initial Visit: 18 Attended Visits: 10 Missed Visits: 2     SUMMARY OF TREATMENT  Pt seen for vestibular rehab for 10  sessions for B BPPV. Therapy has included the Epley maneuver to reduce symptoms.    CURRENT STATUS  Pt reports 90% improvement since [facility]. She reports improvements in the frequency of dizziness. She is able to look up/down without restrictions. She will still get the dizziness when she rolls out of bed and getting out of bed on the L. Upon reassessment, pt was negative for [employer] indicating negative for BPPV. At this time pt denies functional limitations and improved overall functional mobility. Therefore, pt is ready for DC at this time. Other Objective/Functional Measures:   Negative garcia yoon GARCIA   FOTO improved to 97/100 from 75/100 at last assessment     Goal/Measure of Progress Goal Met? Progress towards goals / Updated goals:  1. Pt will demo - [employer] B as an indicator of improved BPPV symptoms. Met 18  2. Pt will be able to roll over in bed without restrictions/vertigo to improved sleeping stability. MET 18         RECOMMENDATIONS  Discontinue therapy. Progressing towards or have reached established goals. If you have any questions/comments please contact us directly at [phone]. Thank you for allowing us to assist in the care of your patient.     Therapist Signature: Savana Moreira DPT Date: 18     Time: 6:19 PM